# Patient Record
Sex: MALE | Race: WHITE | URBAN - METROPOLITAN AREA
[De-identification: names, ages, dates, MRNs, and addresses within clinical notes are randomized per-mention and may not be internally consistent; named-entity substitution may affect disease eponyms.]

---

## 2022-01-21 ENCOUNTER — OFFICE VISIT (OUTPATIENT)
Dept: INTERNAL MEDICINE CLINIC | Facility: CLINIC | Age: 27
End: 2022-01-21
Payer: COMMERCIAL

## 2022-01-21 VITALS
HEIGHT: 68 IN | HEART RATE: 90 BPM | BODY MASS INDEX: 29.25 KG/M2 | WEIGHT: 193 LBS | DIASTOLIC BLOOD PRESSURE: 69 MMHG | SYSTOLIC BLOOD PRESSURE: 102 MMHG

## 2022-01-21 DIAGNOSIS — Z00.00 ANNUAL PHYSICAL EXAM: Primary | ICD-10-CM

## 2022-01-21 DIAGNOSIS — Z29.9 PROPHYLACTIC MEASURE: ICD-10-CM

## 2022-01-21 DIAGNOSIS — Z11.3 SCREENING FOR STD (SEXUALLY TRANSMITTED DISEASE): ICD-10-CM

## 2022-01-21 DIAGNOSIS — Z23 NEED FOR HPV VACCINATION: ICD-10-CM

## 2022-01-21 PROCEDURE — 3074F SYST BP LT 130 MM HG: CPT | Performed by: INTERNAL MEDICINE

## 2022-01-21 PROCEDURE — 3078F DIAST BP <80 MM HG: CPT | Performed by: INTERNAL MEDICINE

## 2022-01-21 PROCEDURE — 90471 IMMUNIZATION ADMIN: CPT | Performed by: INTERNAL MEDICINE

## 2022-01-21 PROCEDURE — 3008F BODY MASS INDEX DOCD: CPT | Performed by: INTERNAL MEDICINE

## 2022-01-21 PROCEDURE — 99385 PREV VISIT NEW AGE 18-39: CPT | Performed by: INTERNAL MEDICINE

## 2022-01-21 PROCEDURE — 90651 9VHPV VACCINE 2/3 DOSE IM: CPT | Performed by: INTERNAL MEDICINE

## 2022-01-21 NOTE — PROGRESS NOTES
History of Present Illness   Patient ID: Vane De La Cruz is a 27year old male. Chief Complaint: Physical      Vane De La Cruz is a pleasant 27year old male who presents for annual physical exam. Vane De La Cruz is doing well today.       [facility] is not jaundiced. Neurological:      General: No focal deficit present. Mental Status: He is alert and oriented to person, place, and time. Mental status is at baseline.    Psychiatric:         Mood and Affect: Mood normal.         Behavior: Behavior SCREENING CASCADE  - CHLAMYDIA/GONOCOCCUS, ORI    3. Need for HPV vaccination  - HPV HUMAN PAPILLOMA VIRUS VACC 9 MICHOACANO 3 DOSE IM    4. Prophylactic measure  - INFECTIOUS DISEASE - INTERNAL  Clinics and overall doing well today.   Screening labs ordered as ab

## 2022-02-01 LAB
ALBUMIN/GLOBULIN RATIO: 1.6 (CALC) (ref 1–2.5)
ALBUMIN: 4.5 G/DL (ref 3.6–5.1)
ALKALINE PHOSPHATASE: 47 U/L (ref 36–130)
ALT: 27 U/L (ref 9–46)
AST: 21 U/L (ref 10–40)
BILIRUBIN, TOTAL: 0.5 MG/DL (ref 0.2–1.2)
BUN: 13 MG/DL (ref 7–25)
CARBON DIOXIDE: 27 MMOL/L (ref 20–32)
CHLAMYDIA TRACHOMATIS$RNA, TMA: NOT DETECTED
CHLORIDE: 103 MMOL/L (ref 98–110)
CHOL/HDLC RATIO: 3.5 (CALC)
CHOLESTEROL, TOTAL: 145 MG/DL
CREATININE: 1.09 MG/DL (ref 0.6–1.35)
EGFR IF AFRICN AM: 109 ML/MIN/1.73M2
EGFR IF NONAFRICN AM: 94 ML/MIN/1.73M2
GLOBULIN: 2.8 G/DL (CALC) (ref 1.9–3.7)
GLUCOSE: 88 MG/DL (ref 65–99)
HEMATOCRIT: 43.5 % (ref 38.5–50)
HEMOGLOBIN A1C: 5.3 % OF TOTAL HGB
HEMOGLOBIN: 14.7 G/DL (ref 13.2–17.1)
LDL-CHOLESTEROL: 83 MG/DL (CALC)
MCH: 30.9 PG (ref 27–33)
MCHC: 33.8 G/DL (ref 32–36)
MPV: 8.6 FL (ref 7.5–12.5)
NEISSERIA GONORRHOEAE$RNA, TMA: NOT DETECTED
NON-HDL CHOLESTEROL: 104 MG/DL (CALC)
PLATELET COUNT: 359 THOUSAND/UL (ref 140–400)
POTASSIUM: 4.8 MMOL/L (ref 3.5–5.3)
PROTEIN, TOTAL: 7.3 G/DL (ref 6.1–8.1)
RDW: 12 % (ref 11–15)
RED BLOOD CELL COUNT: 4.76 MILLION/UL (ref 4.2–5.8)
SIGNAL TO CUT-OFF: 0.02
SODIUM: 139 MMOL/L (ref 135–146)
TRIGLYCERIDES: 111 MG/DL
TSH W/REFLEX TO FT4: 1.28 MIU/L (ref 0.4–4.5)
WHITE BLOOD CELL COUNT: 6.1 THOUSAND/UL (ref 3.8–10.8)